# Patient Record
Sex: MALE | Race: WHITE | NOT HISPANIC OR LATINO | URBAN - METROPOLITAN AREA
[De-identification: names, ages, dates, MRNs, and addresses within clinical notes are randomized per-mention and may not be internally consistent; named-entity substitution may affect disease eponyms.]

---

## 2024-06-18 ENCOUNTER — OFFICE VISIT (OUTPATIENT)
Dept: URGENT CARE | Facility: CLINIC | Age: 64
End: 2024-06-18
Payer: COMMERCIAL

## 2024-06-18 VITALS
HEART RATE: 89 BPM | TEMPERATURE: 98.4 F | DIASTOLIC BLOOD PRESSURE: 81 MMHG | WEIGHT: 175 LBS | OXYGEN SATURATION: 97 % | SYSTOLIC BLOOD PRESSURE: 130 MMHG | RESPIRATION RATE: 20 BRPM

## 2024-06-18 DIAGNOSIS — J20.9 ACUTE BRONCHITIS, UNSPECIFIED ORGANISM: Primary | ICD-10-CM

## 2024-06-18 PROCEDURE — 1125F AMNT PAIN NOTED PAIN PRSNT: CPT | Performed by: PHYSICIAN ASSISTANT

## 2024-06-18 PROCEDURE — 99203 OFFICE O/P NEW LOW 30 MIN: CPT | Performed by: PHYSICIAN ASSISTANT

## 2024-06-18 PROCEDURE — 1159F MED LIST DOCD IN RCRD: CPT | Performed by: PHYSICIAN ASSISTANT

## 2024-06-18 RX ORDER — BENZONATATE 100 MG/1
100 CAPSULE ORAL 3 TIMES DAILY PRN
Qty: 30 CAPSULE | Refills: 0 | Status: SHIPPED | OUTPATIENT
Start: 2024-06-18 | End: 2024-06-28

## 2024-06-18 RX ORDER — AZITHROMYCIN 250 MG/1
TABLET, FILM COATED ORAL
Qty: 6 TABLET | Refills: 0 | Status: SHIPPED | OUTPATIENT
Start: 2024-06-18

## 2024-06-18 ASSESSMENT — ENCOUNTER SYMPTOMS: COUGH: 1

## 2024-06-18 ASSESSMENT — PAIN SCALES - GENERAL: PAINLEVEL: 4

## 2024-06-18 NOTE — PROGRESS NOTES
Subjective   Patient ID: Gómez Mijares is a 64 y.o. male.    Patient is a 64-year-old male who complains of worsening loose, productive cough that he has been experiencing for the past 1 week.  Patient states that his cough is becoming increasingly burning.  Patient reports mild congestion but denies sinus pressure, ear pain or sore throat.  Patient denies history of asthma or COPD.  Patient reports no fever, chills or myalgia.      Cough    The following portions of the chart were reviewed this encounter and updated as appropriate:       Review of Systems   Respiratory:  Positive for cough.    All other systems reviewed and are negative.  Objective   Physical Exam  Vitals and nursing note reviewed.   Constitutional:       Appearance: Normal appearance. He is normal weight.   HENT:      Head: Normocephalic and atraumatic.      Right Ear: Tympanic membrane, ear canal and external ear normal.      Left Ear: Tympanic membrane, ear canal and external ear normal.      Nose: Nose normal. No congestion or rhinorrhea.      Mouth/Throat:      Mouth: Mucous membranes are moist.      Pharynx: Oropharynx is clear. No oropharyngeal exudate or posterior oropharyngeal erythema.   Eyes:      Extraocular Movements: Extraocular movements intact.      Conjunctiva/sclera: Conjunctivae normal.      Pupils: Pupils are equal, round, and reactive to light.   Cardiovascular:      Rate and Rhythm: Normal rate and regular rhythm.      Pulses: Normal pulses.      Heart sounds: Normal heart sounds.   Pulmonary:      Effort: Pulmonary effort is normal. No respiratory distress.      Breath sounds: Normal breath sounds. No stridor. No wheezing, rhonchi or rales.   Musculoskeletal:      Cervical back: Normal range of motion and neck supple.   Skin:     General: Skin is warm and dry.      Capillary Refill: Capillary refill takes less than 2 seconds.   Neurological:      General: No focal deficit present.      Mental Status: He is alert and oriented to  person, place, and time.   Psychiatric:         Mood and Affect: Mood normal.         Behavior: Behavior normal.         Thought Content: Thought content normal.         Judgment: Judgment normal.     Assessment/Plan   Physical exam findings as noted above.  Patient was provided with prescriptions for Zithromax 250 mg and Tessalon 100 mg.  Supportive care instructions were discussed and the patient verbalizes good understanding of same.    CLINICAL IMPRESSION:  Acute Bronchitis    Diagnoses and all orders for this visit:  Acute bronchitis, unspecified organism  -     azithromycin (Zithromax) 250 mg tablet; Take 2 tablets (500 mg) on  Day 1,  followed by 1 tablet (250 mg) once daily on Days 2 through 5.  -     benzonatate (Tessalon Perles) 100 mg capsule; Take 1 capsule (100 mg) by mouth 3 times a day as needed for cough for up to 10 days.    Patient disposition: Home